# Patient Record
Sex: FEMALE | Race: BLACK OR AFRICAN AMERICAN | NOT HISPANIC OR LATINO | URBAN - METROPOLITAN AREA
[De-identification: names, ages, dates, MRNs, and addresses within clinical notes are randomized per-mention and may not be internally consistent; named-entity substitution may affect disease eponyms.]

---

## 2019-08-12 ENCOUNTER — INPATIENT (INPATIENT)
Facility: HOSPITAL | Age: 59
LOS: 0 days | Discharge: HOME | DRG: 62 | End: 2019-08-13
Attending: PSYCHIATRY & NEUROLOGY | Admitting: PSYCHIATRY & NEUROLOGY
Payer: COMMERCIAL

## 2019-08-12 VITALS
DIASTOLIC BLOOD PRESSURE: 90 MMHG | TEMPERATURE: 98 F | OXYGEN SATURATION: 100 % | RESPIRATION RATE: 18 BRPM | HEIGHT: 67 IN | WEIGHT: 145.06 LBS | HEART RATE: 98 BPM | SYSTOLIC BLOOD PRESSURE: 157 MMHG

## 2019-08-12 DIAGNOSIS — R20.0 ANESTHESIA OF SKIN: ICD-10-CM

## 2019-08-12 LAB
ALBUMIN SERPL ELPH-MCNC: 4.6 G/DL — SIGNIFICANT CHANGE UP (ref 3.3–5)
ALP SERPL-CCNC: 52 U/L — SIGNIFICANT CHANGE UP (ref 40–120)
ALT FLD-CCNC: 16 U/L — SIGNIFICANT CHANGE UP (ref 10–45)
ANION GAP SERPL CALC-SCNC: 13 MMOL/L — SIGNIFICANT CHANGE UP (ref 5–17)
APTT BLD: 26.7 SEC — LOW (ref 27.5–36.3)
AST SERPL-CCNC: 21 U/L — SIGNIFICANT CHANGE UP (ref 10–40)
BASOPHILS # BLD AUTO: 0.1 K/UL — SIGNIFICANT CHANGE UP (ref 0–0.2)
BASOPHILS NFR BLD AUTO: 1.1 % — SIGNIFICANT CHANGE UP (ref 0–2)
BILIRUB SERPL-MCNC: 0.2 MG/DL — SIGNIFICANT CHANGE UP (ref 0.2–1.2)
BLD GP AB SCN SERPL QL: NEGATIVE — SIGNIFICANT CHANGE UP
BUN SERPL-MCNC: 15 MG/DL — SIGNIFICANT CHANGE UP (ref 7–23)
CALCIUM SERPL-MCNC: 9.6 MG/DL — SIGNIFICANT CHANGE UP (ref 8.4–10.5)
CHLORIDE SERPL-SCNC: 100 MMOL/L — SIGNIFICANT CHANGE UP (ref 96–108)
CO2 SERPL-SCNC: 25 MMOL/L — SIGNIFICANT CHANGE UP (ref 22–31)
CREAT SERPL-MCNC: 0.88 MG/DL — SIGNIFICANT CHANGE UP (ref 0.5–1.3)
EOSINOPHIL # BLD AUTO: 0.1 K/UL — SIGNIFICANT CHANGE UP (ref 0–0.5)
EOSINOPHIL NFR BLD AUTO: 1.3 % — SIGNIFICANT CHANGE UP (ref 0–6)
GLUCOSE SERPL-MCNC: 116 MG/DL — HIGH (ref 70–99)
HCT VFR BLD CALC: 44.1 % — SIGNIFICANT CHANGE UP (ref 34.5–45)
HGB BLD-MCNC: 13.9 G/DL — SIGNIFICANT CHANGE UP (ref 11.5–15.5)
INR BLD: 0.94 RATIO — SIGNIFICANT CHANGE UP (ref 0.88–1.16)
LYMPHOCYTES # BLD AUTO: 3 K/UL — SIGNIFICANT CHANGE UP (ref 1–3.3)
LYMPHOCYTES # BLD AUTO: 57.2 % — HIGH (ref 13–44)
MCHC RBC-ENTMCNC: 28.4 PG — SIGNIFICANT CHANGE UP (ref 27–34)
MCHC RBC-ENTMCNC: 31.5 GM/DL — LOW (ref 32–36)
MCV RBC AUTO: 90.1 FL — SIGNIFICANT CHANGE UP (ref 80–100)
MONOCYTES # BLD AUTO: 0.4 K/UL — SIGNIFICANT CHANGE UP (ref 0–0.9)
MONOCYTES NFR BLD AUTO: 7.8 % — SIGNIFICANT CHANGE UP (ref 2–14)
NEUTROPHILS # BLD AUTO: 1.7 K/UL — LOW (ref 1.8–7.4)
NEUTROPHILS NFR BLD AUTO: 32.6 % — LOW (ref 43–77)
PLATELET # BLD AUTO: 218 K/UL — SIGNIFICANT CHANGE UP (ref 150–400)
POTASSIUM SERPL-MCNC: 3.8 MMOL/L — SIGNIFICANT CHANGE UP (ref 3.5–5.3)
POTASSIUM SERPL-SCNC: 3.8 MMOL/L — SIGNIFICANT CHANGE UP (ref 3.5–5.3)
PROT SERPL-MCNC: 7.8 G/DL — SIGNIFICANT CHANGE UP (ref 6–8.3)
PROTHROM AB SERPL-ACNC: 10.7 SEC — SIGNIFICANT CHANGE UP (ref 10–12.9)
RBC # BLD: 4.89 M/UL — SIGNIFICANT CHANGE UP (ref 3.8–5.2)
RBC # FLD: 12.7 % — SIGNIFICANT CHANGE UP (ref 10.3–14.5)
RH IG SCN BLD-IMP: POSITIVE — SIGNIFICANT CHANGE UP
SODIUM SERPL-SCNC: 138 MMOL/L — SIGNIFICANT CHANGE UP (ref 135–145)
TROPONIN T, HIGH SENSITIVITY RESULT: <6 NG/L — SIGNIFICANT CHANGE UP (ref 0–51)
WBC # BLD: 5.2 K/UL — SIGNIFICANT CHANGE UP (ref 3.8–10.5)
WBC # FLD AUTO: 5.2 K/UL — SIGNIFICANT CHANGE UP (ref 3.8–10.5)

## 2019-08-12 PROCEDURE — 93010 ELECTROCARDIOGRAM REPORT: CPT

## 2019-08-12 PROCEDURE — 70544 MR ANGIOGRAPHY HEAD W/O DYE: CPT | Mod: 26,59

## 2019-08-12 PROCEDURE — 99223 1ST HOSP IP/OBS HIGH 75: CPT

## 2019-08-12 PROCEDURE — 99291 CRITICAL CARE FIRST HOUR: CPT

## 2019-08-12 PROCEDURE — 70551 MRI BRAIN STEM W/O DYE: CPT | Mod: 26

## 2019-08-12 RX ORDER — ALTEPLASE 100 MG
6 KIT INTRAVENOUS ONCE
Refills: 0 | Status: COMPLETED | OUTPATIENT
Start: 2019-08-12 | End: 2019-08-12

## 2019-08-12 RX ORDER — LABETALOL HCL 100 MG
10 TABLET ORAL ONCE
Refills: 0 | Status: DISCONTINUED | OUTPATIENT
Start: 2019-08-12 | End: 2019-08-13

## 2019-08-12 RX ORDER — SODIUM CHLORIDE 9 MG/ML
1000 INJECTION INTRAMUSCULAR; INTRAVENOUS; SUBCUTANEOUS
Refills: 0 | Status: DISCONTINUED | OUTPATIENT
Start: 2019-08-12 | End: 2019-08-13

## 2019-08-12 RX ORDER — ALTEPLASE 100 MG
54.3 KIT INTRAVENOUS ONCE
Refills: 0 | Status: COMPLETED | OUTPATIENT
Start: 2019-08-12 | End: 2019-08-12

## 2019-08-12 RX ADMIN — ALTEPLASE 54.3 MILLIGRAM(S): KIT at 07:05

## 2019-08-12 RX ADMIN — ALTEPLASE 360 MILLIGRAM(S): KIT at 07:05

## 2019-08-12 NOTE — H&P ADULT - NSHPPHYSICALEXAM_GEN_ALL_CORE
NAD  AAOx3, follows all commands, crosses midline, speech fluent, no dysarthria noted  EOMI, PERRLA, no facial droop noted  LUE 4-/5 with distal weakness > proximal (3/5), clear drift present, rapid movements impaired, R hand orbiting left on serial exams  diminished sensation to LT on LUE, but LLE intact

## 2019-08-12 NOTE — ED PROVIDER NOTE - CLINICAL SUMMARY MEDICAL DECISION MAKING FREE TEXT BOX
60yo f no pmhx pw left arm numbness since 445am. sudden onset numbness in left arm while working. atraumatic, left shoulder to hand, no weakness code stroke called, cth, cta, neuro dispo

## 2019-08-12 NOTE — ED PROVIDER NOTE - PHYSICAL EXAMINATION
Physical Exam:  Gen: NAD, AOx3, non-toxic appearing, able to ambulate without assistance  Head: NCAT  HEENT: EOMI, PEERLA, normal conjunctiva, tongue midline, oral mucosa moist  Lung: CTAB, no respiratory distress, no wheezes/rhonchi/rales B/L, speaking in full sentences  CV: RRR, no murmurs, rubs or gallops  Abd: soft, NT, ND, no guarding, no rigidity, no rebound tenderness, no CVA tenderness   MSK: no visible deformities, ROM normal in UE/LE, no back pain  Neuro: no focal motor deficits, LUE subjective numbness over entire arm but able to discriminate touch and pressure and pain, no pronator drift, normal strength in LUE  Skin: Warm, well perfused, no rash, no leg swelling  Psych: normal affect, calm  ~Zuhair Hare D.O. -Resident

## 2019-08-12 NOTE — ED PROVIDER NOTE - CRITICAL CARE PROVIDED
interpretation of diagnostic studies/direct patient care (not related to procedure)/additional history taking/documentation/consultation with other physicians/consult w/ pt's family directly relating to pts condition

## 2019-08-12 NOTE — DISCHARGE NOTE PROVIDER - NSDCCPCAREPLAN_GEN_ALL_CORE_FT
PRINCIPAL DISCHARGE DIAGNOSIS  Diagnosis: Stroke  Assessment and Plan of Treatment: Continue taking your medications. Continue dietary and lifestyle modifications. Follow up with your primary care doctor and with vascular neurology. If you experience any new symptoms, call [phone] PRINCIPAL DISCHARGE DIAGNOSIS  Diagnosis: Stroke  Assessment and Plan of Treatment: Continue taking your medications. Continue dietary and lifestyle modifications. Follow up with your primary care doctor and with vascular neurology. If you experience any new symptoms, call [phone]

## 2019-08-12 NOTE — H&P ADULT - NSHPREVIEWOFSYSTEMS_GEN_ALL_CORE
Patient denies trauma, LOC, fever, chills, HA, vision changes, tinnitus, dysarthria, dysphagia, dizziness, chest pain, palpitations, SOB, nausea, vomiting, diarrhea, dysuria and incontinence.

## 2019-08-12 NOTE — H&P ADULT - HISTORY OF PRESENT ILLNESS
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 LNW and time of onset 4:45AM on 8/12. Neurological exam significant for numbness to LT in all LUE, drift within 10 seconds but did not hit bed, slowed rapid movements and orbiting on left. No prior hx of stroke, seizure or trauma per pt; denies any antiplatelet or AC agents. After detailed discussion of the risks vs. benefits with the patient regarding TPA, pt opted to proceed with tpa given likely debilitating deficit that will affect her livelihood and ADLs. Confirmed with teach-back method. Not and endovascular candidate given absent LVO. NIHSS =2, MRS =0

## 2019-08-12 NOTE — H&P ADULT - ATTENDING COMMENTS
Ms. 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 LNW and time of onset 4:45AM on 8/12.   Neurological exam on rounds today is nonfocal. She thinks her symptoms have completely resolved since.  Impression: Right hemispheric cortical/subcortical/deep MCA territory ischemia versus transient ischemic attack (right hemisphere).  Plan:   - Cont with IV tPA precautions including BP goal<180/100 mmHg   - Not a candidate for neurointervention due to no large vessel occlusion  - No antiplatelets or anticoagulants at this time, Aspirin to be considered 24 hours after the IV tPA and repeat brain imaging  - DVT prophylaxis with s/c heparin to be considered in 24 hours from IV tPA after brain imaging  - Consider Atorvastatin 80 mg after establishing enteral access or passing swallow eval  - Allow permissive HTN up to 180/105 mmHg   - Cont with IV hydration  - TTE with bubble study and telemetry to look for the cardiac source of embolism  - LDL and HbA1C   - Swallow eval  - PT/OT/Speech eval once medically stable    Above mentioned plan was discussed with patient, available family member and patient in detail. All the questions were answered and concerns were addressed.

## 2019-08-12 NOTE — ED ADULT NURSE REASSESSMENT NOTE - NS ED NURSE REASSESS COMMENT FT1
No sign of acute distress. Pt denies any headaches or changes in vision. No focal deficits at this time. Hematoma noted to L Hand, at failed IV puncture site. No bleeding noted.   Updated on plan of care. Awaiting Transfer to Stroke unit.

## 2019-08-12 NOTE — ED ADULT NURSE NOTE - OBJECTIVE STATEMENT
58 YO male with no stated PMH    via walk in presenting with complaints of left arm weakness. As per patient, at approximately 0445, while pt was at work, pt noted weakness to the left arm. Pt denies chest pain, shortness of breath, visual disturbances, numbness/tingling, fever, chills, diaphoresis, headache, nausea, vomiting, constipation, diarrhea, or urinary symptoms. No TPA as per neurology resident.   Pt Axox4, gross neuro intact, PERRL 3 mm, NIIHSS1. Lungs clear throughout bilateral. S1S2 heard. Abdomen soft, non-tender, non-distended. Skin warm, dry, and intact. Pt placed in position of comfort. Pt educated on call bell system and provided call bell. Bed in lowest position, wheels locked, appropriate side rails raised. Pt denies needs at this time.

## 2019-08-12 NOTE — STROKE CODE NOTE - DISPOSITION
Stroke Unit Delay in giving TPA, due to discussion with patient and family about risks vs. benefit. Pt took time to decide to proceed with tPA/Stroke Unit

## 2019-08-12 NOTE — ED PROVIDER NOTE - ATTENDING CONTRIBUTION TO CARE
59F, no sig pmh, presents with sudden onset L arm numbness, weakness at ~445 AM. pt noted when at work. no trauma, no pain. denies cp, sob, abd pain. denies headache, dizziness, vision changes, confusion. Pt took 2 asa pta.     PE: NAD, NCAT, MMM, Trachea midline, Normal conjunctiva, lungs CTAB, S1/S2 RRR, Normal perfusion, 2+ radial pulses bilat, Abdomen Soft, NTND, No rebound/guarding, No LE edema, No deformity of extremities, No rashes,  No focal motor or sensory deficits. CN II-XII intact. Visual fields intact. EOMI, PERRLA. Facial sensation equal bilat. Smile and eye closure equal bilat. Hearing intact bilat. Palate elevation equal, tongue protrusion midline. Lateral head rotation equal bilat. 5/5 strength UE and LE bilat. Sensation grossly intact. +decreased strength LUE wrist extension, elbow extension, +decreased sensation LUE, +L sided pronator drift.    Code stroke called upon initial eval. Does have appreciable deficits. Stat imaging performed. Neuro at bedside. Imaging unremarkable. Extensive discussion of risks/benefits of TPA discussed with pt and son by myself and neuro resident. Pt wishes to have drug administered. Will give, admit to stroke unit. - Gonzalez Hale MD

## 2019-08-12 NOTE — ED PROVIDER NOTE - NS ED ROS FT
ROS:  GENERAL: No fever, no chills  EYES: no change in vision  HEENT: no trouble swallowing, no trouble speaking  CARDIAC: no chest pain  PULMONARY: no cough, no shortness of breath  GI: no abdominal pain, no nausea, no vomiting, no diarrhea, no constipation  : No dysuria, no frequency, no change in appearance, or odor of urine  SKIN: no rashes  NEURO: no headache, no weakness, L arm numbness   MSK: No joint pain  ~Zuhair Hare D.O. -Resident

## 2019-08-12 NOTE — ED ADULT NURSE NOTE - CHPI ED NUR SYMPTOMS NEG
no dizziness/no loss of consciousness/no vomiting/no fever/no numbness/no nausea/no confusion/no change in level of consciousness/no blurred vision

## 2019-08-12 NOTE — H&P ADULT - NSHPLABSRESULTS_GEN_ALL_CORE
13.9   5.2   )-----------( 218      ( 12 Aug 2019 06:08 )             44.1   08-12    138  |  100  |  15  ----------------------------<  116<H>  3.8   |  25  |  0.88    Ca    9.6      12 Aug 2019 06:08    TPro  7.8  /  Alb  4.6  /  TBili  0.2  /  DBili  x   /  AST  21  /  ALT  16  /  AlkPhos  52  08-12  PT/INR - ( 12 Aug 2019 06:08 )   PT: 10.7 sec;   INR: 0.94 ratio         PTT - ( 12 Aug 2019 06:08 )  PTT:26.7 sec    < from: CT Brain Stroke Protocol (08.12.19 @ 06:35) >    IMPRESSION:     No acute intracranial hemorrhage, mass effect, or CT evidence of an acute   vascular territorial infarct.    Minimal chronic ischemic changes in the frontoparietal white matter.    < end of copied text >    < from: CT Angio Neck w/ IV Cont (08.12.19 @ 06:33) >    IMPRESSION:    CTA NECK: Mild atherosclerotic disease at the left common carotid artery   bifurcation with mild narrowing of the distal left common carotid artery.   No significant stenosis of the cervical internal carotid arteries based   on NASCET criteria. Patent cervical vertebral arteries. No evidence of   cervical carotid or vertebral artery dissection.    CTA HEAD:  No significant stenosis or occlusion of the major proximal   arterial branches.    2 mm inferiorly directed outpouching arising from the supraclinoid left   internal carotid artery which may represent an infundibular origin of a   hypoplastic left posterior communicating artery or small aneurysm.    < end of copied text >

## 2019-08-12 NOTE — ED ADULT NURSE REASSESSMENT NOTE - NS ED NURSE REASSESS COMMENT FT1
After lengthy discussion regarding risks and benefits of TPA. Initially plan of care not to provide TPA; however, neurology determined with NIHSS of 2, that TPA was beneficial for patient. PT weight obtained via zero-ed standing scale. Pt weight 67 kg. Neurology resident states to go by weight 66.4. 40.2 mg of TPA discarded. Neurology resident pushed 6 mg of TPA at 0705. Infusion of 53.8 mg to go over hour starting at 0705. Second nurse present at bedside to confirm dosing. Pt verbalizes understanding of risks and benefits of TPA. Pt placed in position of comfort. Pt educated on call bell system and provided call bell. Bed in lowest position, wheels locked, appropriate side rails raised. Pt denies needs at this time.

## 2019-08-12 NOTE — H&P ADULT - ASSESSMENT
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 LNW and time of onset 4:45AM on 8/12.     Impression: L hemiparesis likely mixed sensorimotor lacunar syndrome etiology possibly small vessel diseases. TPA adminisetered 7:05am    - goal BP < 180/110  - Neuro checks q2hr in Stroke Unit  - Dysphagia screen in 12-24hrs    Imaging/Studies  - MRI brain w/o cont  - MRA brain w/o cont   - MRA neck w/ cont  - Repeat Head CT or MRI in 24hrs evaluate for hemorrhagic conversion or get CTH earlier for change in mental status  - TTE   - Telemetry monitoring   - PT/ OT  - Speech and Swallow Evaluation    Labs  - HgA1c, Lipid profile, TSH, B12    Meds  - ASA 81mg + DVT PPx after 24hrs if no significant bleeding on repeat imaging  - Atorvastatin 80mg QHS    med rec completed 58 yo RH AA lady w/ no reported PMHx, 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 LNW and time of onset 4:45AM on 8/12.     Impression: L hemiparesis likely mixed sensorimotor lacunar syndrome etiology possibly small vessel diseases. TPA adminisetered 7:05am    - goal BP < 180/110  - Neuro checks q2hr in Stroke Unit  - Dysphagia screen in 12-24hrs    Imaging/Studies  - MRI brain w/o cont  - MRA brain w/o cont - to reassess CTA finding L supraclinoid possible aneurysm  - Repeat Head CT or MRI in 24hrs evaluate for hemorrhagic conversion or get CTH earlier for change in mental status  - TTE   - Telemetry monitoring   - PT/ OT  - Speech and Swallow Evaluation    Labs  - HgA1c, Lipid profile, TSH, B12    Meds  - ASA 81mg + DVT PPx after 24hrs if no significant bleeding on repeat imaging  - Atorvastatin 80mg QHS    med rec completed    - Patient's questions and concerns addressed. Pt voiced understanding.      case d/w ED team and stroke attending Dr. Guan

## 2019-08-12 NOTE — ED ADULT NURSE NOTE - ED STAT RN HANDOFF DETAILS
Bedside report given to on coming nurse Galo RN. Understands pmh, medications given and plan of care for patient. Patient in stable condition, vital signs updated, has no complaints at this time and has been updated on care plan. Explained to patient that it is change of shift and new nurse is taking over, pt verbalized understanding.

## 2019-08-12 NOTE — DISCHARGE NOTE PROVIDER - CARE PROVIDER_API CALL
Hilda Gamboa (NP; RN)  NP in Family Health  [address], [address]  [place] [address]  Phone: [phone]  Fax: [phone]  Follow Up Time:

## 2019-08-12 NOTE — STROKE CODE NOTE - NIH STROKE SCALE: 2. BEST GAZE, QM
Patient seen in anticoagulation clinic. Reviewed past inr and dose with patient. Patient denies any missed dose of Warfarin. Patient denies any changes in meds or health. Reports Vit K intake has been consistent. Patient reminded to call office with any changes in meds or health. Warfarin dosed per protocol.  On site provider is Dr DIANA Garcia.   (0) Normal

## 2019-08-13 VITALS
DIASTOLIC BLOOD PRESSURE: 69 MMHG | OXYGEN SATURATION: 100 % | HEART RATE: 91 BPM | SYSTOLIC BLOOD PRESSURE: 115 MMHG | RESPIRATION RATE: 17 BRPM

## 2019-08-13 LAB
ALBUMIN SERPL ELPH-MCNC: 4 G/DL — SIGNIFICANT CHANGE UP (ref 3.3–5)
ALP SERPL-CCNC: 45 U/L — SIGNIFICANT CHANGE UP (ref 40–120)
ALT FLD-CCNC: 14 U/L — SIGNIFICANT CHANGE UP (ref 10–45)
ANION GAP SERPL CALC-SCNC: 12 MMOL/L — SIGNIFICANT CHANGE UP (ref 5–17)
AST SERPL-CCNC: 15 U/L — SIGNIFICANT CHANGE UP (ref 10–40)
BASOPHILS # BLD AUTO: 0.04 K/UL — SIGNIFICANT CHANGE UP (ref 0–0.2)
BASOPHILS NFR BLD AUTO: 1 % — SIGNIFICANT CHANGE UP (ref 0–2)
BILIRUB DIRECT SERPL-MCNC: <0.1 MG/DL — SIGNIFICANT CHANGE UP (ref 0–0.2)
BILIRUB INDIRECT FLD-MCNC: >0.1 MG/DL — LOW (ref 0.2–1)
BILIRUB SERPL-MCNC: 0.2 MG/DL — SIGNIFICANT CHANGE UP (ref 0.2–1.2)
BUN SERPL-MCNC: 11 MG/DL — SIGNIFICANT CHANGE UP (ref 7–23)
CALCIUM SERPL-MCNC: 9 MG/DL — SIGNIFICANT CHANGE UP (ref 8.4–10.5)
CHLORIDE SERPL-SCNC: 106 MMOL/L — SIGNIFICANT CHANGE UP (ref 96–108)
CHOLEST SERPL-MCNC: 200 MG/DL — HIGH (ref 10–199)
CO2 SERPL-SCNC: 24 MMOL/L — SIGNIFICANT CHANGE UP (ref 22–31)
CREAT SERPL-MCNC: 0.73 MG/DL — SIGNIFICANT CHANGE UP (ref 0.5–1.3)
EOSINOPHIL # BLD AUTO: 0.04 K/UL — SIGNIFICANT CHANGE UP (ref 0–0.5)
EOSINOPHIL NFR BLD AUTO: 1 % — SIGNIFICANT CHANGE UP (ref 0–6)
ESTIMATED AVERAGE GLUCOSE: 117 MG/DL — HIGH (ref 68–114)
FOLATE SERPL-MCNC: 10.9 NG/ML — SIGNIFICANT CHANGE UP
GLUCOSE SERPL-MCNC: 91 MG/DL — SIGNIFICANT CHANGE UP (ref 70–99)
HBA1C BLD-MCNC: 5.7 % — HIGH (ref 4–5.6)
HBA1C BLD-MCNC: 5.7 % — HIGH (ref 4–5.6)
HCT VFR BLD CALC: 41.9 % — SIGNIFICANT CHANGE UP (ref 34.5–45)
HCV AB S/CO SERPL IA: 0.1 S/CO — SIGNIFICANT CHANGE UP (ref 0–0.99)
HCV AB SERPL-IMP: SIGNIFICANT CHANGE UP
HDLC SERPL-MCNC: 55 MG/DL — SIGNIFICANT CHANGE UP
HGB BLD-MCNC: 13.1 G/DL — SIGNIFICANT CHANGE UP (ref 11.5–15.5)
IMM GRANULOCYTES NFR BLD AUTO: 0.2 % — SIGNIFICANT CHANGE UP (ref 0–1.5)
LIPID PNL WITH DIRECT LDL SERPL: 128 MG/DL — HIGH
LYMPHOCYTES # BLD AUTO: 2.01 K/UL — SIGNIFICANT CHANGE UP (ref 1–3.3)
LYMPHOCYTES # BLD AUTO: 47.7 % — HIGH (ref 13–44)
MCHC RBC-ENTMCNC: 28.3 PG — SIGNIFICANT CHANGE UP (ref 27–34)
MCHC RBC-ENTMCNC: 31.3 GM/DL — LOW (ref 32–36)
MCV RBC AUTO: 90.5 FL — SIGNIFICANT CHANGE UP (ref 80–100)
MONOCYTES # BLD AUTO: 0.37 K/UL — SIGNIFICANT CHANGE UP (ref 0–0.9)
MONOCYTES NFR BLD AUTO: 8.8 % — SIGNIFICANT CHANGE UP (ref 2–14)
NEUTROPHILS # BLD AUTO: 1.74 K/UL — LOW (ref 1.8–7.4)
NEUTROPHILS NFR BLD AUTO: 41.3 % — LOW (ref 43–77)
PLATELET # BLD AUTO: 213 K/UL — SIGNIFICANT CHANGE UP (ref 150–400)
POTASSIUM SERPL-MCNC: 3.8 MMOL/L — SIGNIFICANT CHANGE UP (ref 3.5–5.3)
POTASSIUM SERPL-SCNC: 3.8 MMOL/L — SIGNIFICANT CHANGE UP (ref 3.5–5.3)
PROT SERPL-MCNC: 6.6 G/DL — SIGNIFICANT CHANGE UP (ref 6–8.3)
RBC # BLD: 4.63 M/UL — SIGNIFICANT CHANGE UP (ref 3.8–5.2)
RBC # FLD: 14.5 % — SIGNIFICANT CHANGE UP (ref 10.3–14.5)
SODIUM SERPL-SCNC: 142 MMOL/L — SIGNIFICANT CHANGE UP (ref 135–145)
T4 AB SER-ACNC: 5 UG/DL — SIGNIFICANT CHANGE UP (ref 4.6–12)
TOTAL CHOLESTEROL/HDL RATIO MEASUREMENT: 3.6 RATIO — SIGNIFICANT CHANGE UP (ref 3.3–7.1)
TRIGL SERPL-MCNC: 84 MG/DL — SIGNIFICANT CHANGE UP (ref 10–149)
TSH SERPL-MCNC: 0.89 UIU/ML — SIGNIFICANT CHANGE UP (ref 0.27–4.2)
VIT B12 SERPL-MCNC: 354 PG/ML — SIGNIFICANT CHANGE UP (ref 232–1245)
WBC # BLD: 4.21 K/UL — SIGNIFICANT CHANGE UP (ref 3.8–10.5)
WBC # FLD AUTO: 4.21 K/UL — SIGNIFICANT CHANGE UP (ref 3.8–10.5)

## 2019-08-13 PROCEDURE — 93306 TTE W/DOPPLER COMPLETE: CPT | Mod: 26

## 2019-08-13 PROCEDURE — 86803 HEPATITIS C AB TEST: CPT

## 2019-08-13 PROCEDURE — 70498 CT ANGIOGRAPHY NECK: CPT

## 2019-08-13 PROCEDURE — 80076 HEPATIC FUNCTION PANEL: CPT

## 2019-08-13 PROCEDURE — 80061 LIPID PANEL: CPT

## 2019-08-13 PROCEDURE — 70544 MR ANGIOGRAPHY HEAD W/O DYE: CPT

## 2019-08-13 PROCEDURE — 93005 ELECTROCARDIOGRAM TRACING: CPT

## 2019-08-13 PROCEDURE — 86901 BLOOD TYPING SEROLOGIC RH(D): CPT

## 2019-08-13 PROCEDURE — 70496 CT ANGIOGRAPHY HEAD: CPT

## 2019-08-13 PROCEDURE — 85027 COMPLETE CBC AUTOMATED: CPT

## 2019-08-13 PROCEDURE — 70450 CT HEAD/BRAIN W/O DYE: CPT

## 2019-08-13 PROCEDURE — 86900 BLOOD TYPING SEROLOGIC ABO: CPT

## 2019-08-13 PROCEDURE — 85610 PROTHROMBIN TIME: CPT

## 2019-08-13 PROCEDURE — 70551 MRI BRAIN STEM W/O DYE: CPT

## 2019-08-13 PROCEDURE — 80048 BASIC METABOLIC PNL TOTAL CA: CPT

## 2019-08-13 PROCEDURE — 97161 PT EVAL LOW COMPLEX 20 MIN: CPT

## 2019-08-13 PROCEDURE — 82746 ASSAY OF FOLIC ACID SERUM: CPT

## 2019-08-13 PROCEDURE — 86850 RBC ANTIBODY SCREEN: CPT

## 2019-08-13 PROCEDURE — 99291 CRITICAL CARE FIRST HOUR: CPT

## 2019-08-13 PROCEDURE — 84443 ASSAY THYROID STIM HORMONE: CPT

## 2019-08-13 PROCEDURE — 97166 OT EVAL MOD COMPLEX 45 MIN: CPT

## 2019-08-13 PROCEDURE — 82607 VITAMIN B-12: CPT

## 2019-08-13 PROCEDURE — 85730 THROMBOPLASTIN TIME PARTIAL: CPT

## 2019-08-13 PROCEDURE — 84436 ASSAY OF TOTAL THYROXINE: CPT

## 2019-08-13 PROCEDURE — 82962 GLUCOSE BLOOD TEST: CPT

## 2019-08-13 PROCEDURE — 84484 ASSAY OF TROPONIN QUANT: CPT

## 2019-08-13 PROCEDURE — 83036 HEMOGLOBIN GLYCOSYLATED A1C: CPT

## 2019-08-13 PROCEDURE — 99233 SBSQ HOSP IP/OBS HIGH 50: CPT

## 2019-08-13 PROCEDURE — 70450 CT HEAD/BRAIN W/O DYE: CPT | Mod: 26,59

## 2019-08-13 PROCEDURE — 80053 COMPREHEN METABOLIC PANEL: CPT

## 2019-08-13 PROCEDURE — 93306 TTE W/DOPPLER COMPLETE: CPT

## 2019-08-13 RX ORDER — ATORVASTATIN CALCIUM 80 MG/1
1 TABLET, FILM COATED ORAL
Qty: 60 | Refills: 0
Start: 2019-08-13 | End: 2019-10-11

## 2019-08-13 RX ORDER — SODIUM CHLORIDE 9 MG/ML
500 INJECTION INTRAMUSCULAR; INTRAVENOUS; SUBCUTANEOUS ONCE
Refills: 0 | Status: COMPLETED | OUTPATIENT
Start: 2019-08-13 | End: 2019-08-13

## 2019-08-13 RX ORDER — ENOXAPARIN SODIUM 100 MG/ML
40 INJECTION SUBCUTANEOUS DAILY
Refills: 0 | Status: DISCONTINUED | OUTPATIENT
Start: 2019-08-13 | End: 2019-08-13

## 2019-08-13 RX ORDER — ASPIRIN/CALCIUM CARB/MAGNESIUM 324 MG
81 TABLET ORAL DAILY
Refills: 0 | Status: DISCONTINUED | OUTPATIENT
Start: 2019-08-13 | End: 2019-08-13

## 2019-08-13 RX ORDER — ASPIRIN/CALCIUM CARB/MAGNESIUM 324 MG
1 TABLET ORAL
Qty: 60 | Refills: 0
Start: 2019-08-13 | End: 2019-10-11

## 2019-08-13 RX ORDER — SODIUM CHLORIDE 9 MG/ML
1000 INJECTION INTRAMUSCULAR; INTRAVENOUS; SUBCUTANEOUS
Refills: 0 | Status: DISCONTINUED | OUTPATIENT
Start: 2019-08-13 | End: 2019-08-13

## 2019-08-13 RX ADMIN — Medication 81 MILLIGRAM(S): at 12:48

## 2019-08-13 RX ADMIN — ENOXAPARIN SODIUM 40 MILLIGRAM(S): 100 INJECTION SUBCUTANEOUS at 12:48

## 2019-08-13 RX ADMIN — SODIUM CHLORIDE 1000 MILLILITER(S): 9 INJECTION INTRAMUSCULAR; INTRAVENOUS; SUBCUTANEOUS at 03:22

## 2019-08-13 RX ADMIN — SODIUM CHLORIDE 85 MILLILITER(S): 9 INJECTION INTRAMUSCULAR; INTRAVENOUS; SUBCUTANEOUS at 03:50

## 2019-08-13 NOTE — DIETITIAN INITIAL EVALUATION ADULT. - PERTINENT MEDS FT
MEDICATIONS  (STANDING):  sodium chloride 0.9%. 1000 milliLiter(s) (85 mL/Hr) IV Continuous <Continuous>    MEDICATIONS  (PRN):  labetalol Injectable 10 milliGRAM(s) IV Push once PRN for SBP>180

## 2019-08-13 NOTE — DISCHARGE NOTE NURSING/CASE MANAGEMENT/SOCIAL WORK - NSDCPNDISPN_GEN_ALL_CORE
Activities of daily living, including home environment that might     exacerbate pain or reduce effectiveness of the pain management plan of care as well as strategies to address these issues/Side effects of pain management treatment/Safe use, storage and disposal of opioids when prescribed/Education provided on the pain management plan of care/Opioids not applicable/not prescribed

## 2019-08-13 NOTE — DISCHARGE NOTE NURSING/CASE MANAGEMENT/SOCIAL WORK - NSDCPEPTSTRK_GEN_ALL_CORE
Stroke support groups for patients, families, and friends/Prescribed medications/Call 911 for stroke/Need for follow up after discharge/Risk factors for stroke/Stroke warning signs and symptoms/Signs and symptoms of stroke/Stroke education booklet

## 2019-08-13 NOTE — PROGRESS NOTE ADULT - ASSESSMENT
ASSESSMENT: Ms. 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 LNW and time of onset 4:45AM on 8/12.      Impression: Right hemispheric TIA of undetermined etiology.     NEURO: neurologically overall improved, continue close monitoring for neurologic deterioration, normotension as tolerated, titrate statin to LDL goal less than 70, MR imaging as noted, Physical therapy/OT eval pending, anticipate home.    ANTITHROMBOTIC THERAPY: ASA     PULMONARY:  protecting airway, saturating well     CARDIOVASCULAR:  TTE: ef 78%, mitral annular calcification, minimal MR, mild TR , cardiac monitoring with event monitor to ensure no occult arrhythmia                               SBP goal: normotension     GASTROINTESTINAL:  dysphagia screen  passed, tolerating diet      Diet: regular     RENAL: BUN/Cr without acute change, maintain adequate hydration, good urine output      Na Goal: Greater than 135     Weber:    HEMATOLOGY: H/H without anemia, she should have all outpatient routine age and risk appropriate malignancy screenings , Platelets 213     DVT ppx: Heparin s.c [] LMWH [x]     ID: afebrile, no leukocytosis     OTHER: current condition and plan of care d/w patient at bedside.     DISPOSITION: Rehab or home depending on PT eval once stable and workup is complete      CORE MEASURES:        Admission NIHSS: 2     TPA: [x] YES [] NO      LDL/HDL: 128/55     Depression Screen: 0     Statin Therapy: y      Dysphagia Screen: [x] PASS [] FAIL     Smoking [] YES x[] NO      Afib [] YES [x] NO     Stroke Education [x] YES [] NO    Obtain screening lower extremity venous ultrasound in patients who meet 1 or more of the following criteria as patient is high risk for DVT/PE on admission:   [] History of DVT/PE  []Hypercoagulable states (Factor V Leiden, Cancer, OCP, etc. )  []Prolonged immobility (hemiplegia/hemiparesis/post operative or any other extended immobilization)  [] Transferred from outside facility (Rehab or Long term care)  [] Age </= to 50

## 2019-08-13 NOTE — OCCUPATIONAL THERAPY INITIAL EVALUATION ADULT - LIVES WITH, PROFILE
spouse/children/Pt lives with spouse and son in  with 5 ZULEYKA +HR and 7 steps to bedroom/bathroom. Pt independent with all aspects of ADLs and IADLs.

## 2019-08-13 NOTE — DIETITIAN INITIAL EVALUATION ADULT. - ENERGY NEEDS
Ht: 67"  Wt: 147   BMI: 23.1 kg/m2   IBW: 135 (+/-10%)     within IBW  Edema: none        Skin: no pressure injuries documented nurses notes/vital signs

## 2019-08-13 NOTE — PROGRESS NOTE ADULT - SUBJECTIVE AND OBJECTIVE BOX
THE PATIENT WAS SEEN AND EXAMINED BY ME WITH THE HOUSESTAFF AND STROKE TEAM DURING MORNING ROUNDS.   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 LNW and time of onset 4:45AM on 8/12. Neurological exam was significant for numbness to LT in all LUE, drift within 10 seconds but did not hit bed, slowed rapid movements and orbiting on left. No prior hx of stroke, seizure or trauma per pt; denies any antiplatelet or AC agents. After detailed discussion of the risks vs. benefits with the patient regarding TPA, pt opted to proceed with tpa given likely debilitating deficit that will affect her livelihood and ADLs. Confirmed with teach-back method. Not and endovascular candidate given absent LVO. NIHSS =2, MRS =0      SUBJECTIVE: No events overnight.  No new neurologic complaints.      aspirin enteric coated 81 milliGRAM(s) Oral daily  enoxaparin Injectable 40 milliGRAM(s) SubCutaneous daily  labetalol Injectable 10 milliGRAM(s) IV Push once PRN  sodium chloride 0.9%. 1000 milliLiter(s) IV Continuous <Continuous>      PHYSICAL EXAM:   Vital Signs Last 24 Hrs  T(C): 37 (13 Aug 2019 08:25), Max: 37 (13 Aug 2019 08:25)  T(F): 98.6 (13 Aug 2019 08:25), Max: 98.6 (13 Aug 2019 08:25)  HR: 91 (13 Aug 2019 12:00) (66 - 97)  BP: 115/69 (13 Aug 2019 12:00) (96/66 - 130/72)  BP(mean): 82 (13 Aug 2019 12:00) (75 - 96)  RR: 17 (13 Aug 2019 12:00) (13 - 22)  SpO2: 100% (13 Aug 2019 12:00) (98% - 100%)    General: No acute distress  HEENT: EOM intact, visual fields full  Abdomen: Soft, nontender, nondistended   Extremities: No edema    NEUROLOGICAL EXAM:  Mental status: Awake, alert, oriented x3, no aphasia, no neglect, normal memory   Cranial Nerves: No facial asymmetry, no nystagmus, no dysarthria,  tongue midline  Motor exam: Normal tone, no drift, 5/5 RUE, 5/5 RLE, 5/5 LUE, 5/5 LLE, normal fine finger movements.  Sensation: Intact to light touch   Coordination/ Gait: No dysmetria, ANGELIKA intact and symmetric bilaterally    LABS:                        13.1   4.21  )-----------( 213      ( 13 Aug 2019 08:46 )             41.9    08-13    142  |  106  |  11  ----------------------------<  91  3.8   |  24  |  0.73    Ca    9.0      13 Aug 2019 06:22    TPro  6.6  /  Alb  4.0  /  TBili  0.2  /  DBili  <0.1  /  AST  15  /  ALT  14  /  AlkPhos  45  08-13  PT/INR - ( 12 Aug 2019 06:08 )   PT: 10.7 sec;   INR: 0.94 ratio         PTT - ( 12 Aug 2019 06:08 )  PTT:26.7 sec  Hemoglobin A1C, Whole Blood: 5.7 % (08-13 @ 08:46)  Hemoglobin A1C, Whole Blood: 5.7 % (08-13 @ 08:46)      IMAGING: Reviewed by me.      CT Head No Cont  08.13.19    Volume loss with minimal microvascular disease, no acutehemorrhage or   midline shift. If symptoms persist consider follow-up head CT or MR if no   contraindications        08.12.19    CTA NECK: Mild atherosclerotic disease at the left common carotid artery   bifurcation with mild narrowing of the distal left common carotid artery.   No significant stenosis of the cervical internal carotid arteries based   on NASCET criteria. Patent cervical vertebral arteries. No evidence of   cervical carotid or vertebral artery dissection.    CTA HEAD:  No significant stenosis or occlusion of the major proximal   arterial branches.    2 mm inferiorly directed outpouching arising from the supraclinoid left   internal carotid artery which may represent an infundibular origin of a   hypoplastic left posterior communicating artery or small aneurysm.    MRI MR Angio Head No Cont 08.12.19  Ventricles and sulci unremarkable in size for age. There are few tiny   nonspecific foci of white matter T2 hyperintensity, likely minimal   microvascular disease or migraine sequela. There is no significant   restricted diffusion, hemorrhage or midline shift. Basal cisterns are   patent.     Redemonstration of a 2.5 mm  right posterior communicating artery origin   outpouching that tapers, likely a prominent infundibulum as opposed to a   true saccular aneurysm, follow-up on subsequent imaging to ensure   stability suggested. Proximal anterior middle and posterior cerebral   arteries are patent, with stenoses of proximal left A2, and distal middle   and posterior cerebral artery branches.

## 2019-08-13 NOTE — OCCUPATIONAL THERAPY INITIAL EVALUATION ADULT - PRECAUTIONS/LIMITATIONS, REHAB EVAL
fall precautions/After detailed discussion of the risks vs. benefits with the patient regarding TPA, pt opted to proceed with tpa given likely debilitating deficit that will affect her livelihood and ADLs. Confirmed with teach-back method. Not and endovascular candidate given absent LVO. Pt is s/p tPA 8/12 07:05. MRI ANGIO HEAD (8/13): Ventricles and sulci unremarkable in size for age. There are few tiny nonspecific foci of white matter T2 hyperintensity, likely minimal

## 2019-08-13 NOTE — PHYSICAL THERAPY INITIAL EVALUATION ADULT - ADDITIONAL COMMENTS
Verified Results  (1) BASIC METABOLIC PROFILE [id number] 08:42AM Zachary Yoli   REPORT COMMENT:  REQ ON HOLD  FASTING:NO     Test Name Result Flag Reference   GLUCOSE 117 mg/dL H 65-99   Fasting reference interval   UREA NITROGEN (BUN) 10 mg/dL  7-25   CREATININE 0 74 mg/dL  0 50-0 99   For patients >52years of age, the reference limit  for Creatinine is approximately 13% higher for people  identified as -American  eGFR NON-AFR   AMERICAN 86 mL/min/1 73m2  > OR = 60   eGFR AFRICAN AMERICAN 100 mL/min/1 73m2  > OR = 60   BUN/CREATININE RATIO   8-32   NOT APPLICABLE (calc)   SODIUM 136 mmol/L  135-146   POTASSIUM 4 5 mmol/L  3 5-5 3   CHLORIDE 99 mmol/L     CARBON DIOXIDE 29 mmol/L  20-31   CALCIUM 9 5 mg/dL  8 6-10 4       Discussion/Summary   blood sugar came back slightly high    watch sweets and fatty food   normal kidney function    - Dr Bishop [place]   Electronically signed by : Anurag Arora MD; Nov 22 2016 12:21PM EST                       (Author) Pt lives with her son and  in a split level house with 5 steps to enter, no HRs & 7 steps then 7 steps inside, +HR. Pt was independent with all ADLs and ambulation PTA. Pt did not use a AD for ambulation PTA.

## 2019-08-13 NOTE — OCCUPATIONAL THERAPY INITIAL EVALUATION ADULT - PERTINENT HX OF CURRENT PROBLEM, REHAB EVAL
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 LNW and time of onset 4:45AM on 8/12. Neurological exam significant for numbness to LT in all LUE, drift within 10 seconds but did not hit bed, slowed rapid movements and orbiting on left. No prior hx of stroke, seizure or trauma per pt; denies any antiplatelet or AC agents.

## 2019-08-13 NOTE — PHYSICAL THERAPY INITIAL EVALUATION ADULT - DISCHARGE DISPOSITION, PT EVAL
no skilled PT needs/Patient is cleared for D/C home from physical therapy standpoint. Patient is agreeable, MATHEW Bañuelos and MAURICE Gaytan aware.

## 2019-08-13 NOTE — DISCHARGE NOTE NURSING/CASE MANAGEMENT/SOCIAL WORK - NSDCDPATPORTLINK_GEN_ALL_CORE
You can access the [facility] Patient Portal, offered by [facility], by registering with the following website: [email or web address]

## 2019-08-13 NOTE — PHYSICAL THERAPY INITIAL EVALUATION ADULT - PRECAUTIONS/LIMITATIONS, REHAB EVAL
fall precautions/+Head CT 8/13/19: Volume loss with minimal microvascular disease, no acute hemorrhage or midline shift. CTA NECK 8/12/19: Mild atherosclerotic disease at the left common carotid artery bifurcation with mild narrowing of the distal left common carotid artery. (-) CTA HEAD 8/12/19: No significant stenosis or occlusion of the major proximal arterial branches. 2 mm inferiorly directed outpouching arising from the supraclinoid left internal carotid artery which may represent an infundibular origin of a hypoplastic left posterior communicating artery or small aneurysm. Brain CT 8/12/19: Minimal chronic ischemic changes in the frontoparietal white matter. Head MRI 8/12/19: Ventricles and sulci unremarkable in size for age. There are few tiny nonspecific foci of white matter T2 hyperintensity, likely minimal microvascular disease or migraine sequela. There is no significant restricted diffusion, hemorrhage or midline shift. Basal cisterns are patent. Redemonstration of a 2.5 mm  right posterior communicating artery origin outpouching that tapers, likely a prominent infundibulum as opposed to a true saccular aneurysm, follow-up on subsequent imaging to ensure stability suggested. Proximal anterior middle and posterior cerebral arteries are patent, with stenoses of proximal left A2, and distal middle and posterior cerebral artery branches.

## 2019-08-13 NOTE — DIETITIAN INITIAL EVALUATION ADULT. - OTHER INFO
Pt seen for: Stroke Unit length of stay   Adm dx: CVA vs TIA    GI issues: no N/V  Last BM: 8/12    Food allergies/Intolerances: NKFA   Vit/supplement PTA: none    Diet PTA: limits fat, Na intake     Subjective/Objective information: pt reports good appetite PTA, no recent wt changes, usual wt 145 lb.    Education: Not indicated at this time, pt follows Fat, Na restricted diet

## 2019-10-22 NOTE — ED ADULT NURSE NOTE - PAIN RATING/NUMBER SCALE (0-10): ACTIVITY
Note Text (......Xxx Chief Complaint.): This diagnosis correlates with the Other (Free Text): Discussed treatment options, such as MOHS and ED&C. Discussed cure rates and possible depth of treatment. Patient prefers not to have MOHS procedure. Detail Level: Detailed 0

## 2020-05-27 NOTE — DISCHARGE NOTE NURSING/CASE MANAGEMENT/SOCIAL WORK - NSDPACMPNY_GEN_ALL_CORE
Vitals/Constitutional/EENT/Resp/CV/GI//MS/Neuro/Skin/Heme-Lymph-Imm. Pursuant to the emergency declaration under the [address] [place] [address] and the [employer] Act, this Virtual Visit was conducted with patient's (and/or legal guardian's) consent, to reduce the patient's risk of exposure to COVID-19 and provide necessary medical care. The patient (and/or legal guardian) has also been advised to contact this office for worsening conditions or problems, and seek emergency medical treatment and/or call 911 if deemed necessary. Patient identification was verified at the start of the visit: Yes    Total time spent for this encounter: Not billed by time    Services were provided through a video synchronous discussion virtually to substitute for in-person clinic visit. Patient and provider were located at their individual homes. --Jo Hernadez MD on 5/27/2020 at 3:12 PM    An electronic signature was used to authenticate this note.     Jo Hernadez MD Family

## 2022-07-28 ENCOUNTER — EMERGENCY (EMERGENCY)
Facility: HOSPITAL | Age: 62
LOS: 1 days | Discharge: HOME | End: 2022-07-28
Attending: EMERGENCY MEDICINE
Payer: COMMERCIAL

## 2022-07-28 VITALS
TEMPERATURE: 98 F | DIASTOLIC BLOOD PRESSURE: 90 MMHG | WEIGHT: 145.06 LBS | RESPIRATION RATE: 18 BRPM | SYSTOLIC BLOOD PRESSURE: 135 MMHG | HEIGHT: 67 IN | HEART RATE: 102 BPM | OXYGEN SATURATION: 98 %

## 2022-07-28 PROCEDURE — 99285 EMERGENCY DEPT VISIT HI MDM: CPT

## 2022-07-28 PROCEDURE — 99053 MED SERV 10PM-8AM 24 HR FAC: CPT
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
## 2022-07-29 VITALS
TEMPERATURE: 98 F | SYSTOLIC BLOOD PRESSURE: 132 MMHG | DIASTOLIC BLOOD PRESSURE: 89 MMHG | OXYGEN SATURATION: 98 % | HEART RATE: 98 BPM | RESPIRATION RATE: 18 BRPM

## 2022-07-29 PROBLEM — Z78.9 OTHER SPECIFIED HEALTH STATUS: Chronic | Status: ACTIVE | Noted: 2019-08-12

## 2022-07-29 PROCEDURE — 99284 EMERGENCY DEPT VISIT MOD MDM: CPT | Mod: 25

## 2022-07-29 PROCEDURE — 72125 CT NECK SPINE W/O DYE: CPT | Mod: MA

## 2022-07-29 PROCEDURE — 72125 CT NECK SPINE W/O DYE: CPT | Mod: 26,MA

## 2022-07-29 PROCEDURE — 72128 CT CHEST SPINE W/O DYE: CPT | Mod: MA

## 2022-07-29 PROCEDURE — 72128 CT CHEST SPINE W/O DYE: CPT | Mod: 26,MA

## 2022-07-29 RX ORDER — ACETAMINOPHEN 500 MG
650 TABLET ORAL ONCE
Refills: 0 | Status: COMPLETED | OUTPATIENT
Start: 2022-07-29 | End: 2022-07-29

## 2022-07-29 RX ADMIN — Medication 650 MILLIGRAM(S): at 02:40
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
## 2022-07-29 NOTE — ED PROVIDER NOTE - NSFOLLOWUPCLINICS_GEN_ALL_ED_FT
[facility]  Ortho/Spine  [address] [place] [address]  Phone: [phone]  Fax:     [facility] [place]  Neurosurgery/Spine  [address], [address]  [place] [address]  Phone: [phone]  Fax:     [facility]  Neurosurgery/Spine  [address] [place] [address]  Phone: [phone]  Fax:

## 2022-07-29 NOTE — ED PROVIDER NOTE - PHYSICAL EXAMINATION
General: Alert and Orientated x 3. No apparent distress.  Head: Normocephalic and atraumatic.  Eyes: PERRLA with EOMI.  Neck: Supple. Trachea midline. C5-6 ttp, midline.   Cardiac: Normal S1 and S2 w/ RRR. No murmurs appreciated. No JVD appreciated.  Pulmonary: Vesicular breath sounds bilaterally. No increased WOB. No wheezes or crackles.  Abdominal: Soft, non-tender. (+) bowel sounds appreciated in all 4 quadrants. No hepatosplenomegaly.   Neurologic: No focal sensory or motor deficits.  Musculoskeletal: Strength appropriate in all 4 extremities for age with no limited ROM. Back : T1-2 TTP, midpsinal.   Skin: Color appropriate for race. Intact, warm, and well-perfused.  Psychiatric: Appropriate mood and affect. No apparent risk to self or others.
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
## 2022-07-29 NOTE — ED PROVIDER NOTE - PATIENT PORTAL LINK FT
You can access the FollowMyHealth Patient Portal offered by [facility] by registering at the following website: [email or web address]. By joining [employer]’s FollowMyHealth portal, you will also be able to view your health information using other applications (apps) compatible with our system.

## 2022-07-29 NOTE — ED PROVIDER NOTE - ADDITIONAL NOTES AND INSTRUCTIONS:
Please excuse Venita Negrete from work until the above date, or as return is tolerated.     Faina Giron MD

## 2022-07-29 NOTE — ED PROVIDER NOTE - NSFOLLOWUPINSTRUCTIONS_ED_ALL_ED_FT
You will receive a call to make an appointment with a spine specialist. List of specialists have been provided.      Lab and imaging results, if performed, were discussed with you along with your discharge diagnosis    Follow up with your doctor in 1 week - bring copies of your results if you were given. If you do not have a primary doctor, please call [phone] to find one convenient for you    Return to ED for any new or worsening symptoms including but not limited to: development of chest pain, shortness of breath, fever, vomiting, focal numbness, weakness or tingling, any severe CP, headache, abdominal pain, back pain.      Continue all prescribed medications    To control your pain at home, you should take Ibuprofen 400 mg along with Tylenol 650mg-1000mg every 6 to 8 hours. Limit your maximum daily Tylenol from all sources to 4000mg.    Rest and keep yourself hydrated with fluids

## 2023-01-11 ENCOUNTER — EMERGENCY (EMERGENCY)
Facility: HOSPITAL | Age: 63
LOS: 1 days | Discharge: HOME | End: 2023-01-11
Attending: EMERGENCY MEDICINE
Payer: COMMERCIAL

## 2023-01-11 VITALS
HEIGHT: 67 IN | SYSTOLIC BLOOD PRESSURE: 167 MMHG | WEIGHT: 145.06 LBS | OXYGEN SATURATION: 99 % | HEART RATE: 115 BPM | RESPIRATION RATE: 20 BRPM | TEMPERATURE: 98 F | DIASTOLIC BLOOD PRESSURE: 94 MMHG

## 2023-01-11 LAB
ALBUMIN SERPL ELPH-MCNC: 4.5 G/DL — SIGNIFICANT CHANGE UP (ref 3.3–5)
ALP SERPL-CCNC: 49 U/L — SIGNIFICANT CHANGE UP (ref 40–120)
ALT FLD-CCNC: 22 U/L — SIGNIFICANT CHANGE UP (ref 10–45)
ANION GAP SERPL CALC-SCNC: 15 MMOL/L — SIGNIFICANT CHANGE UP (ref 5–17)
APTT BLD: 27.6 SEC — SIGNIFICANT CHANGE UP (ref 27.5–35.5)
AST SERPL-CCNC: 24 U/L — SIGNIFICANT CHANGE UP (ref 10–40)
BASOPHILS # BLD AUTO: 0.03 K/UL — SIGNIFICANT CHANGE UP (ref 0–0.2)
BASOPHILS NFR BLD AUTO: 0.6 % — SIGNIFICANT CHANGE UP (ref 0–2)
BILIRUB SERPL-MCNC: 0.2 MG/DL — SIGNIFICANT CHANGE UP (ref 0.2–1.2)
BUN SERPL-MCNC: 12 MG/DL — SIGNIFICANT CHANGE UP (ref 7–23)
CALCIUM SERPL-MCNC: 9.7 MG/DL — SIGNIFICANT CHANGE UP (ref 8.4–10.5)
CHLORIDE SERPL-SCNC: 100 MMOL/L — SIGNIFICANT CHANGE UP (ref 96–108)
CO2 SERPL-SCNC: 24 MMOL/L — SIGNIFICANT CHANGE UP (ref 22–31)
CREAT SERPL-MCNC: 0.85 MG/DL — SIGNIFICANT CHANGE UP (ref 0.5–1.3)
D DIMER BLD IA.RAPID-MCNC: 210 NG/ML DDU — SIGNIFICANT CHANGE UP
EGFR: 77 ML/MIN/1.73M2 — SIGNIFICANT CHANGE UP
EOSINOPHIL # BLD AUTO: 0.04 K/UL — SIGNIFICANT CHANGE UP (ref 0–0.5)
EOSINOPHIL NFR BLD AUTO: 0.8 % — SIGNIFICANT CHANGE UP (ref 0–6)
FLUAV AG NPH QL: SIGNIFICANT CHANGE UP
FLUBV AG NPH QL: SIGNIFICANT CHANGE UP
GLUCOSE SERPL-MCNC: 107 MG/DL — HIGH (ref 70–99)
HCT VFR BLD CALC: 43.9 % — SIGNIFICANT CHANGE UP (ref 34.5–45)
HGB BLD-MCNC: 14.1 G/DL — SIGNIFICANT CHANGE UP (ref 11.5–15.5)
IMM GRANULOCYTES NFR BLD AUTO: 0.6 % — SIGNIFICANT CHANGE UP (ref 0–0.9)
INR BLD: 1.04 RATIO — SIGNIFICANT CHANGE UP (ref 0.88–1.16)
LYMPHOCYTES # BLD AUTO: 1.92 K/UL — SIGNIFICANT CHANGE UP (ref 1–3.3)
LYMPHOCYTES # BLD AUTO: 36.8 % — SIGNIFICANT CHANGE UP (ref 13–44)
MCHC RBC-ENTMCNC: 29.1 PG — SIGNIFICANT CHANGE UP (ref 27–34)
MCHC RBC-ENTMCNC: 32.1 GM/DL — SIGNIFICANT CHANGE UP (ref 32–36)
MCV RBC AUTO: 90.7 FL — SIGNIFICANT CHANGE UP (ref 80–100)
MONOCYTES # BLD AUTO: 0.36 K/UL — SIGNIFICANT CHANGE UP (ref 0–0.9)
MONOCYTES NFR BLD AUTO: 6.9 % — SIGNIFICANT CHANGE UP (ref 2–14)
NEUTROPHILS # BLD AUTO: 2.84 K/UL — SIGNIFICANT CHANGE UP (ref 1.8–7.4)
NEUTROPHILS NFR BLD AUTO: 54.3 % — SIGNIFICANT CHANGE UP (ref 43–77)
NRBC # BLD: 0 /100 WBCS — SIGNIFICANT CHANGE UP (ref 0–0)
PLATELET # BLD AUTO: 238 K/UL — SIGNIFICANT CHANGE UP (ref 150–400)
POTASSIUM SERPL-MCNC: 3.5 MMOL/L — SIGNIFICANT CHANGE UP (ref 3.5–5.3)
POTASSIUM SERPL-SCNC: 3.5 MMOL/L — SIGNIFICANT CHANGE UP (ref 3.5–5.3)
PROT SERPL-MCNC: 7.7 G/DL — SIGNIFICANT CHANGE UP (ref 6–8.3)
PROTHROM AB SERPL-ACNC: 12.1 SEC — SIGNIFICANT CHANGE UP (ref 10.5–13.4)
RBC # BLD: 4.84 M/UL — SIGNIFICANT CHANGE UP (ref 3.8–5.2)
RBC # FLD: 14.4 % — SIGNIFICANT CHANGE UP (ref 10.3–14.5)
RSV RNA NPH QL NAA+NON-PROBE: SIGNIFICANT CHANGE UP
SARS-COV-2 RNA SPEC QL NAA+PROBE: SIGNIFICANT CHANGE UP
SODIUM SERPL-SCNC: 139 MMOL/L — SIGNIFICANT CHANGE UP (ref 135–145)
TROPONIN T, HIGH SENSITIVITY RESULT: <6 NG/L — SIGNIFICANT CHANGE UP (ref 0–51)
WBC # BLD: 5.22 K/UL — SIGNIFICANT CHANGE UP (ref 3.8–10.5)
WBC # FLD AUTO: 5.22 K/UL — SIGNIFICANT CHANGE UP (ref 3.8–10.5)

## 2023-01-11 PROCEDURE — 71046 X-RAY EXAM CHEST 2 VIEWS: CPT | Mod: 26

## 2023-01-11 PROCEDURE — 99223 1ST HOSP IP/OBS HIGH 75: CPT

## 2023-01-11 RX ORDER — ATORVASTATIN CALCIUM 80 MG/1
10 TABLET, FILM COATED ORAL AT BEDTIME
Refills: 0 | Status: DISCONTINUED | OUTPATIENT
Start: 2023-01-11 | End: 2023-01-14

## 2023-01-11 RX ORDER — ASPIRIN/CALCIUM CARB/MAGNESIUM 324 MG
81 TABLET ORAL DAILY
Refills: 0 | Status: DISCONTINUED | OUTPATIENT
Start: 2023-01-11 | End: 2023-01-14

## 2023-01-11 RX ORDER — FAMOTIDINE 10 MG/ML
20 INJECTION INTRAVENOUS ONCE
Refills: 0 | Status: COMPLETED | OUTPATIENT
Start: 2023-01-11 | End: 2023-01-11

## 2023-01-11 RX ORDER — ASPIRIN/CALCIUM CARB/MAGNESIUM 324 MG
243 TABLET ORAL ONCE
Refills: 0 | Status: COMPLETED | OUTPATIENT
Start: 2023-01-11 | End: 2023-01-11

## 2023-01-11 RX ADMIN — ATORVASTATIN CALCIUM 10 MILLIGRAM(S): 80 TABLET, FILM COATED ORAL at 21:52

## 2023-01-11 RX ADMIN — Medication 243 MILLIGRAM(S): at 12:21

## 2023-01-11 RX ADMIN — FAMOTIDINE 20 MILLIGRAM(S): 10 INJECTION INTRAVENOUS at 12:21

## 2023-01-11 NOTE — ED ADULT NURSE NOTE - OBJECTIVE STATEMENT
received pt in assigned room a&xo3 woke up with midsternal chest pain, palpitations,  & sob when taking a deep breath, denies any h/a, dizziness, cough , sore throat or fever , skin intact, resps even and non labored, ekg completed, afebrile , ivl placed labs sent, medicated per MD orders, resting in nad

## 2023-01-11 NOTE — ED PROVIDER NOTE - CLINICAL SUMMARY MEDICAL DECISION MAKING FREE TEXT BOX
63 yo F with pmhx "CVA" only on asa 81mg presenting with sudden onset of substernal chest pain and sob x 30 mins. Sinus tachycardia on ekg. PE patient tachycardic and hypertensive. nonreproducible chest pain. Will ro acs vs ro pe 61 yo F with pmhx "CVA" only on asa 81mg presenting with sudden onset of substernal chest pain and sob x 30 mins. Sinus tachycardia on ekg. PE patient tachycardic and hypertensive. nonreproducible chest pain. Will ro acs vs ro pe    Attending MD Salas: 61 yo F with pmhx ? CVA on baby asa only presenting with thirty minutes of chest pain and palpitations. Worse when she takes a deep breath with mild associated SOB.  Patient denies fever, abd pain, nvd, dysuria, hematuria, tingling, numbness, weakness, and back pain.  On exam pt well appearing, in NAD, heart tachycardia, lungs CTAB, abd NTND, extremities without swelling, strength 5/5 in all extremities and skin without rash.     My DDx includes ACS, pleuritic chest pain, PE (but patient with no PE risk factors and D-dimer is negative with no hypoxia)  .    I received additional history from prior charts which revealed no history of CAD.     Labs were ordered and independently reviewed and demonstrate normal troponin and d-dimer.    Imaging was ordered and independently reviewed and demonstrates CXR with clear lungs.   An EKG was ordered and independently reviewed and demonstrates sinus tachycardia at 112.      Results and management discussed with CDU and ED team.     Plan:  patient to go to CDU for further cardiac workup.

## 2023-01-11 NOTE — ED PROVIDER NOTE - ATTENDING APP SHARED VISIT CONTRIBUTION OF CARE
Attending MD Salas:   I personally have seen and examined this patient.  Physician assistant note reviewed and agree on plan of care and except where noted.  Please see my MDM for further details.

## 2023-01-11 NOTE — ED CDU PROVIDER INITIAL DAY NOTE - CLINICAL SUMMARY MEDICAL DECISION MAKING FREE TEXT BOX
Attending MD Salas: 63 yo F with pmhx ? CVA on baby asa only presenting with thirty minutes of chest pain and palpitations. Worse when she takes a deep breath with mild associated SOB.  Patient denies fever, abd pain, nvd, dysuria, hematuria, tingling, numbness, weakness, and back pain.  On exam pt well appearing, in NAD, heart tachycardia, lungs CTAB, abd NTND, extremities without swelling, strength 5/5 in all extremities and skin without rash.     My DDx includes ACS, pleuritic chest pain, PE (but patient with no PE risk factors and D-dimer is negative with no hypoxia)  .    I received additional history from prior charts which revealed no history of CAD.     Labs were ordered and independently reviewed and demonstrate normal troponin and d-dimer.    Imaging was ordered and independently reviewed and demonstrates CXR with clear lungs.   An EKG was ordered and independently reviewed and demonstrates sinus tachycardia at 112.      Results and management discussed with CDU and ED team.     Plan:  patient to go to CDU for further cardiac workup.

## 2023-01-11 NOTE — ED PROVIDER NOTE - OBJECTIVE STATEMENT
63 yo F with pmhx "CVA" on baby asa only presenting with thirty minutes of chest pain and palpitations. Patient states she has this constant sensation in the substernal region of her chest. Doesn't radiate. Worse when she takes a deep breath. No history of heart problems. Denies recent travel. Patient has never seen a cardiologist. Patient admits to sob. Denies sick contacts and cough. Patient denies fever, abd pain, nvd, dysuria, hematuria, tingling, numbness, weakness, and back pain

## 2023-01-11 NOTE — ED ADULT TRIAGE NOTE - NS ED NURSE BANDS TYPE
8/15/2019 3:02 PM 
 
Ms. Charlette Brewer You are scheduled for an upgrade of your device to a Biventricular Implantable Cardioverter Defibrillator  
at [facility] on Friday September 6 th,2019. Please arrive at the patient registration desk located on the 2nd floor of the main building at 9 am. 
 
Do not eat or drink anything after midnight the night before your procedure. You will stay overnight. You will need a  to take you home when you are discharged. You may take your normal medications with a sip of water the morning of your procedure. Blood thinner instructions: Continue blood thinning medications as prescribed. Hold any blood glucose lowering medications the day of your procedure(glipizide,metformin,insulin,januvia etc). Lab instructions: No labs needed. Labs done on 8/12/19 Please call [facility] if you have any questions at [phone]. Please call the office if you develop any type of illness prior to your procedure. Sincerely, Bessy Saunders MD/Dee Harrell LPN 
 
 Name band;

## 2023-01-11 NOTE — ED CDU PROVIDER INITIAL DAY NOTE - OBJECTIVE STATEMENT
63 yo F with pmhx "CVA" on baby asa only presenting with thirty minutes of chest pain and palpitations. Patient states chest pain substernal, associated with high heart rate (120s) at home.  Doesn't radiate. pain resolved. No history of heart problems. Denies recent travel. Patient has never seen a cardiologist. denies sob, lightheadedness. Denies sick contacts and cough. Patient denies fever, abd pain, nvd, dysuria, hematuria, tingling, numbness, weakness, and back pain

## 2023-01-12 VITALS
DIASTOLIC BLOOD PRESSURE: 70 MMHG | SYSTOLIC BLOOD PRESSURE: 130 MMHG | HEART RATE: 70 BPM | RESPIRATION RATE: 18 BRPM | OXYGEN SATURATION: 98 % | TEMPERATURE: 98 F

## 2023-01-12 LAB
A1C WITH ESTIMATED AVERAGE GLUCOSE RESULT: 5.8 % — HIGH (ref 4–5.6)
CHOLEST SERPL-MCNC: 183 MG/DL — SIGNIFICANT CHANGE UP
ESTIMATED AVERAGE GLUCOSE: 120 MG/DL — HIGH (ref 68–114)
HDLC SERPL-MCNC: 47 MG/DL — LOW
LIPID PNL WITH DIRECT LDL SERPL: 121 MG/DL — HIGH
NON HDL CHOLESTEROL: 136 MG/DL — HIGH
TRIGL SERPL-MCNC: 74 MG/DL — SIGNIFICANT CHANGE UP
TSH SERPL-MCNC: 0.71 UIU/ML — SIGNIFICANT CHANGE UP (ref 0.27–4.2)

## 2023-01-12 PROCEDURE — 80061 LIPID PANEL: CPT

## 2023-01-12 PROCEDURE — 87637 SARSCOV2&INF A&B&RSV AMP PRB: CPT

## 2023-01-12 PROCEDURE — 96374 THER/PROPH/DIAG INJ IV PUSH: CPT | Mod: XU

## 2023-01-12 PROCEDURE — 85730 THROMBOPLASTIN TIME PARTIAL: CPT

## 2023-01-12 PROCEDURE — 83036 HEMOGLOBIN GLYCOSYLATED A1C: CPT

## 2023-01-12 PROCEDURE — 75574 CT ANGIO HRT W/3D IMAGE: CPT | Mod: 26,MG

## 2023-01-12 PROCEDURE — 99285 EMERGENCY DEPT VISIT HI MDM: CPT | Mod: 25

## 2023-01-12 PROCEDURE — G1004: CPT

## 2023-01-12 PROCEDURE — 85025 COMPLETE CBC W/AUTO DIFF WBC: CPT

## 2023-01-12 PROCEDURE — 36415 COLL VENOUS BLD VENIPUNCTURE: CPT

## 2023-01-12 PROCEDURE — 84484 ASSAY OF TROPONIN QUANT: CPT

## 2023-01-12 PROCEDURE — 84443 ASSAY THYROID STIM HORMONE: CPT

## 2023-01-12 PROCEDURE — 85379 FIBRIN DEGRADATION QUANT: CPT

## 2023-01-12 PROCEDURE — 85610 PROTHROMBIN TIME: CPT

## 2023-01-12 PROCEDURE — 71046 X-RAY EXAM CHEST 2 VIEWS: CPT

## 2023-01-12 PROCEDURE — 99239 HOSP IP/OBS DSCHRG MGMT >30: CPT

## 2023-01-12 PROCEDURE — 93005 ELECTROCARDIOGRAM TRACING: CPT | Mod: XU

## 2023-01-12 PROCEDURE — G0378: CPT

## 2023-01-12 PROCEDURE — 75574 CT ANGIO HRT W/3D IMAGE: CPT | Mod: MG

## 2023-01-12 PROCEDURE — 80053 COMPREHEN METABOLIC PANEL: CPT

## 2023-01-12 RX ORDER — SODIUM CHLORIDE 9 MG/ML
1000 INJECTION INTRAMUSCULAR; INTRAVENOUS; SUBCUTANEOUS ONCE
Refills: 0 | Status: COMPLETED | OUTPATIENT
Start: 2023-01-12 | End: 2023-01-12

## 2023-01-12 RX ORDER — METOPROLOL TARTRATE 50 MG
50 TABLET ORAL ONCE
Refills: 0 | Status: COMPLETED | OUTPATIENT
Start: 2023-01-12 | End: 2023-01-12

## 2023-01-12 RX ADMIN — Medication 50 MILLIGRAM(S): at 09:05

## 2023-01-12 RX ADMIN — SODIUM CHLORIDE 1000 MILLILITER(S): 9 INJECTION INTRAMUSCULAR; INTRAVENOUS; SUBCUTANEOUS at 13:32

## 2023-01-12 RX ADMIN — Medication 81 MILLIGRAM(S): at 13:32

## 2023-01-12 NOTE — ED CDU PROVIDER SUBSEQUENT DAY NOTE - HISTORY
No interval changes since initial CDU provider note. Pt feels well without new complaint. NAD VSS. no events on tele. Plan for CTC today in the setting of episode of chest pain. - CHRISTEL Aguiar

## 2023-01-12 NOTE — ED CDU PROVIDER SUBSEQUENT DAY NOTE - PROGRESS NOTE DETAILS
Patient evaluated at bedside. NAD. Denies any chest pain or palpitations throughout the night. No complaints currently. Will continue telemetry monitoring. Plan for CTC this AM. Will reassess. Karyna Clinton PA-C No events on tele. Patient denies any complaints. CTC results revealed: Calcium score of 12. "No significant moderate to severe category coronary artery stenosis." Incidental findings of "1.2 cm probable left hepatic cyst, Mild dependent atelectasis/scarring and nonspecific groundglass of the   imaged lung parenchyma." Discussed with pt. Answered all questions/concerns. Discussed with CDU attending Dr. New Otoole for d/c. Karyna Clinton PA-C

## 2023-01-12 NOTE — ED CDU PROVIDER DISPOSITION NOTE - CLINICAL COURSE
61 yo F with pmhx "CVA" on baby asa only presenting with thirty minutes of chest pain and palpitations. Patient states chest pain substernal, associated with high heart rate (120s) at home.  Doesn't radiate. pain resolved. No history of heart problems. Denies recent travel. Patient has never seen a cardiologist. denies sob, lightheadedness. Denies sick contacts and cough. Patient denies fever, abd pain, nvd, dysuria, hematuria, tingling, numbness, weakness, and back pain.  ED course: D-dimer 210, Troponin <6. COVID negative. Plan for CTC and tele monitoring in CDU. moderate assist (50% patients effort) 63 yo F with pmhx "CVA" on baby asa only presenting with thirty minutes of chest pain and palpitations. Patient states chest pain substernal, associated with high heart rate (120s) at home.  Doesn't radiate. pain resolved. No history of heart problems. Denies recent travel. Patient has never seen a cardiologist. denies sob, lightheadedness. Denies sick contacts and cough. Patient denies fever, abd pain, nvd, dysuria, hematuria, tingling, numbness, weakness, and back pain.  ED course: D-dimer 210, Troponin <6. COVID negative. Plan for CTC and tele monitoring in CDU.    While in CDU patient had CTC which revealed no severe stenosis, calcium score of 12. Stable for d/c home.

## 2023-01-12 NOTE — ED CDU PROVIDER DISPOSITION NOTE - NSFOLLOWUPCLINICS_GEN_ALL_ED_FT
[facility]  Cardiology  [address] [place] [id number]  Phone: [phone]  Fax:

## 2023-01-12 NOTE — ED CDU PROVIDER SUBSEQUENT DAY NOTE - NS ED ROS FT
Constitutional: No fever or chills  Eyes: No visual changes, no eye pain   CV: +chest pain + palpitations No lower extremity edema  Resp: No SOB no cough  GI: No abd pain. No nausea or vomiting. No diarrhea  : No dysuria, hematuria.   MSK: No musculoskeletal pain  Skin: No rash  Psych: No complaints   Neuro: No headache. No numbness or tingling.   Endo: No known diabetes

## 2023-01-12 NOTE — ED CDU PROVIDER SUBSEQUENT DAY NOTE - PHYSICAL EXAMINATION
· CONSTITUTIONAL: Well appearing, awake, alert, oriented to person, place, time/situation and in no apparent distress.  · CARDIAC: - - -  · CARDIAC RHYTHM: regular  · CARDIAC RATE: normal  · CARDIAC SOUNDS: S1-S2  · CARDIAC PEDAL EDEMA: absent  · RESPIRATORY: Breath sounds clear and equal bilaterally.  · GASTROINTESTINAL: Abdomen soft, non-tender, no rebound, no guarding.  · MUSCULOSKELETAL: Moving all extremities, steady gait   · NEUROLOGICAL: Alert and oriented, clear speech, follows commands   · SKIN: Skin normal color for race, warm, dry and intact. No evidence of rash.  · PSYCH: Appropriate mood and affect

## 2023-01-12 NOTE — ED ADULT NURSE REASSESSMENT NOTE - NSIMPLEMENTINTERV_GEN_ALL_ED
Implemented All Universal Safety Interventions:  [place] to call system. Call bell, personal items and telephone within reach. Instruct patient to call for assistance. Room bathroom lighting operational. Non-slip footwear when patient is off stretcher. Physically safe environment: no spills, clutter or unnecessary equipment. Stretcher in lowest position, wheels locked, appropriate side rails in place.

## 2023-01-12 NOTE — ED CDU PROVIDER DISPOSITION NOTE - NSFOLLOWUPINSTRUCTIONS_ED_ALL_ED_FT
Hydrate.     We recommend you follow up with your primary care provider within the next 2-3 days, please bring all of your results with you.     You can also follow up with **CARDS    Please return to the Emergency Department with new, worsening, or concerning symptoms, such as:  -Shortness of breath or trouble breathing  -Pressure, pain, tightness in chest  -Facial drooping, arm weakness, or speech difficulty   -Head injury or loss of consciousness   -Nonstop bleeding or an open wound     *More detailed information regarding your visit and discharge can be found by reviewing this packet See your Primary Doctor and Cardiologist next week for follow up -- call to discuss.    Stay well hydrated, eat regular healthy meals, continue current medications.    See CHEST PAIN information and return instructions given to you.    Seek immediate medical care for new/worsening symptoms/concerns. See your Primary Doctor within 2-3 days. Call cardiologist within 2-3 days to schedule an appointment for further workup.     [facility]  Cardiology  [address] [place] [id number]  Phone: [phone]    Stay well hydrated, eat regular healthy meals, continue current medications.    See CHEST PAIN information and return instructions given to you.    Seek immediate medical care for new/worsening symptoms/concerns.

## 2023-01-12 NOTE — ED CDU PROVIDER DISPOSITION NOTE - ATTENDING APP SHARED VISIT CONTRIBUTION OF CARE
------------ATTENDING NOTE------------  remained asymptomatic / stable CDU course, cleared by labs/imaging and in depth c/w cardiology for dc w/ outpt optimization medical mgmt, in depth dw all about ddx, tx, sutton, continued close outpt fu.  - Paddy Pablo MD   ---------------------------------------------- ------------ATTENDING NOTE------------  remained asymptomatic / stable CDU course, cleared by labs/imaging/exams and in depth c/w cardiology for dc w/ outpt optimization medical mgmt, in depth dw all about ddx, tx, sutton, continued close outpt fu.  - Paddy Pablo MD   ----------------------------------------------

## 2023-01-12 NOTE — ED CDU PROVIDER DISPOSITION NOTE - PATIENT PORTAL LINK FT
You can access the FollowMyHealth Patient Portal offered by [facility] by registering at the following website: [email or web address]. By joining [employer]’s FollowMyHealth portal, you will also be able to view your health information using other applications (apps) compatible with our system.

## 2023-01-12 NOTE — ED ADULT NURSE REASSESSMENT NOTE - NS ED NURSE REASSESS COMMENT FT1
18.00 Pt is evaluated by CDU MD Samy Thomason. pt is feeling better.  Pt is discharged . Ml out  CHRISTEL Troncoso  explained the follow up care & gave the discharge summary  . Pt has stable vitals steady gait A&OX 4 at the time of Discharge
Received report from MATHEW Perez. Pt is A&Ox3, breathing spontaneously, unlabored and speaking in full sentences on RA. Denies cp/sob. 18G placed RAC. On CM, NSR. Pt safety and comfort measures provided.
remains resting in nad no new complaints, pending dispo
report taken from MATHEW Dominique
reports she is feeling much better, no complaints of chest pain, VSS, pending cdu acceptance
07.00 Am Received the Pt from  MATHEW Dorsey . Pt is Observed for Chest Pain. Received the Pt A&OX 4 obeys commands Ni N/V/D fever chills cp SOB   Comfort care & safety measures continued  IV site looks clean & dry no signs of infiltration noted pt denies  pain IV site .  Pt is advised to call for help  call bell with in the reach pt verbalized the understanding .  pending CDU  MD yuen .GCS 15/15 A&OX 4 PERRLA  size 3 Strong upper & lower extremities steady gait   No facial droop  No Hand Leg drop denies numbness tingling Continue to monitor
Received pt from MATHEW Dan, pt aox4, comfort and safety maintained, pt Oriented to CDU, Pt in observation for chest pain complaints, pt currently denies any chest discomfort, denies numbness/tingling sensations,  IV catheter in place with no signs of irritation or discomfort, Will continue with the plan of care.

## 2024-11-19 NOTE — ED ADULT TRIAGE NOTE - HISTORY OF COVID-19 VACCINATION
In responding to this request, please exercise your independent professional judgment. The fact that a question is asked does not imply that any particular answer is desired or expected. Documentation clarification is required for compliance.   This form is NOT a part of the permanent Medical Record.
Yes

## 2025-02-21 NOTE — ED CLERICAL - DIVISION
----- Message from Norris Baum MD sent at 2/21/2025  2:01 PM CST -----  Please let pt know results are positive for influenza A.    SANKET   [facility]...